# Patient Record
Sex: FEMALE | Race: WHITE | Employment: OTHER | URBAN - METROPOLITAN AREA
[De-identification: names, ages, dates, MRNs, and addresses within clinical notes are randomized per-mention and may not be internally consistent; named-entity substitution may affect disease eponyms.]

---

## 2019-05-25 ENCOUNTER — HOSPITAL ENCOUNTER (EMERGENCY)
Age: 69
Discharge: HOME | End: 2019-05-25
Payer: MEDICARE

## 2019-05-25 ENCOUNTER — APPOINTMENT (OUTPATIENT)
Dept: GENERAL RADIOLOGY | Age: 69
End: 2019-05-25
Payer: MEDICARE

## 2019-05-25 VITALS
DIASTOLIC BLOOD PRESSURE: 71 MMHG | BODY MASS INDEX: 38.32 KG/M2 | SYSTOLIC BLOOD PRESSURE: 144 MMHG | TEMPERATURE: 97.8 F | RESPIRATION RATE: 16 BRPM | WEIGHT: 230 LBS | OXYGEN SATURATION: 97 % | HEIGHT: 65 IN | HEART RATE: 78 BPM
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
## 2019-05-25 DIAGNOSIS — M25.561 ACUTE PAIN OF RIGHT KNEE: ICD-10-CM

## 2019-05-25 DIAGNOSIS — M25.551 PAIN OF RIGHT HIP JOINT: ICD-10-CM

## 2019-05-25 PROCEDURE — 99284 EMERGENCY DEPT VISIT MOD MDM: CPT

## 2019-05-25 PROCEDURE — 73560 X-RAY EXAM OF KNEE 1 OR 2: CPT

## 2019-05-25 PROCEDURE — 6370000000 HC RX 637 (ALT 250 FOR IP): Performed by: NURSE PRACTITIONER

## 2019-05-25 PROCEDURE — 73502 X-RAY EXAM HIP UNI 2-3 VIEWS: CPT

## 2019-05-25 RX ORDER — CITALOPRAM 40 MG/1
40 TABLET ORAL DAILY
COMMUNITY

## 2019-05-25 RX ORDER — HYDROCODONE BITARTRATE AND ACETAMINOPHEN 5; 325 MG/1; MG/1
1 TABLET ORAL EVERY 8 HOURS PRN
COMMUNITY

## 2019-05-25 RX ORDER — LOSARTAN POTASSIUM 50 MG/1
50 TABLET ORAL DAILY
COMMUNITY

## 2019-05-25 RX ORDER — HYDROCODONE BITARTRATE AND ACETAMINOPHEN 5; 325 MG/1; MG/1
1 TABLET ORAL ONCE
Status: COMPLETED | OUTPATIENT
Start: 2019-05-25 | End: 2019-05-25

## 2019-05-25 RX ORDER — MONTELUKAST SODIUM 4 MG/1
1 TABLET, CHEWABLE ORAL 2 TIMES DAILY
COMMUNITY

## 2019-05-25 RX ORDER — GABAPENTIN 100 MG/1
100 CAPSULE ORAL NIGHTLY
COMMUNITY

## 2019-05-25 RX ORDER — ALPRAZOLAM 0.25 MG/1
0.25 TABLET ORAL 3 TIMES DAILY PRN
COMMUNITY

## 2019-05-25 RX ADMIN — HYDROCODONE BITARTRATE AND ACETAMINOPHEN 1 TABLET: 5; 325 TABLET ORAL at 13:26

## 2019-05-25 ASSESSMENT — PAIN SCALES - GENERAL
PAINLEVEL_OUTOF10: 7
PAINLEVEL_OUTOF10: 7
PAINLEVEL_OUTOF10: 5

## 2019-05-25 NOTE — ED NOTES
Bed: 29  Expected date:   Expected time:   Means of arrival:   Comments:  shilpa Clement RN  05/25/19 [id number]

## 2019-05-25 NOTE — ED PROVIDER NOTES
[facility] Emergency Department    CHIEF COMPLAINT  Fall (Pt reports slipped on steps leading into swimming pool, pt reports pain in buttocks as well as right knee post fall. )      [facility]  Emiliano Price is a 71 y.o. female who presents to the ED complaining of pain in right hip and knee after falling prior to arrival. Patient reports that she was walking into the pool and slipped landing onto her buttocks and right side. Patient reports that she twisted her right knee. No head injury loss consciousness. No dizziness or lightheadedness. No neck or back pain. No chest pain or shortness of breath. No abdominal pain or discomfort. No numbness or tingling in extremities. Unilateral weakness. No loss of bowel or bladder function. No nausea, vomiting, or diarrhea. No other complaints, modifying factors or associated symptoms. Nursing notes reviewed. History reviewed. No pertinent past medical history. History reviewed. No pertinent surgical history. History reviewed. No pertinent family history.   Social History     Socioeconomic History    Marital status: Not on file     Spouse name: Not on file    Number of children: Not on file    Years of education: Not on file    Highest education level: Not on file   Occupational History    Not on file   Social Needs    Financial resource strain: Not on file    Food insecurity:     Worry: Not on file     Inability: Not on file    Transportation needs:     Medical: Not on file     Non-medical: Not on file   Tobacco Use    Smoking status: Former Smoker    Smokeless tobacco: Never Used   Substance and Sexual Activity    Alcohol use: Not Currently    Drug use: Not Currently    Sexual activity: Not on file   Lifestyle    Physical activity:     Days per week: Not on file     Minutes per session: Not on file    Stress: Not on file   Relationships    Social connections:     Talks on phone: Not on file     Gets together: Not on file     Attends [employer] service: Not on file     Active member of club or organization: Not on file     Attends meetings of clubs or organizations: Not on file     Relationship status: Not on file    Intimate partner violence:     Fear of current or ex partner: Not on file     Emotionally abused: Not on file     Physically abused: Not on file     Forced sexual activity: Not on file   Other Topics Concern    Not on file   Social History Narrative    Not on file     No current facility-administered medications for this encounter. No current outpatient medications on file. Allergies   Allergen Reactions    Percocet [Oxycodone-Acetaminophen] Rash     HTN       REVIEW OF SYSTEMS  6 systems reviewed, pertinent positives per HPI otherwise noted to be negative    PHYSICAL EXAM  BP (!) 144/71   Pulse 78   Temp 97.8 °F (36.6 °C) (Oral)   Resp 16   Ht 5' 5\" (1.651 m)   Wt 230 lb (104.3 kg)   SpO2 97%   BMI 38.27 kg/m²   GENERAL APPEARANCE: Awake and alert. Cooperative. No acute distress. Nontoxic in appearance. Speech is clear patient answers questions appropriately. HEAD: Normocephalic. Atraumatic. EYES: PERRL. EOM's grossly intact. ENT: Mucous membranes are pink and moist.   NECK: Supple. Normal ROM. No midline bony tenderness, step off or crepitus  CHEST: Equal symmetric chest rise. LUNGS: Breathing is unlabored. Speaking comfortably in full sentences. Lungs clear to auscultation throughout. Abdomen: Nondistended and non tender. EXTREMITIES: MAEE. No acute deformities. Neurovascularly intact. Brisk cap refill. Pulses palpable +2/2 /dorsalis pedis equal bilaterally. No unilateral weakness. Equal grasps bilaterally. No cervical, thoracic, lumbar midline bony tenderness. No step-off or crepitus. Normal dorsal flexion and plantar flexion of the wrist and ankles bilaterally. Limited range of motion of the right patella due to pain.  Pain upon palpation anterior/medial/lateral/posterior aspect of right patella. Pain with palpation of right posterior/lateral hip. No external/internal/shortening or rotation noted. SKIN: Warm and dry. No Rashes, lesions, or ecchymosis. NEUROLOGICAL: Alert and oriented. Strength is 5/5 in all extremities and sensation is intact. No Focal or motor deficits. RADIOLOGY  Xr Hip Right (2-3 Views)    Result Date: 5/25/2019  EXAMINATION: 2 XRAY VIEWS OF THE RIGHT HIP 5/25/2019 1:28 pm COMPARISON: None. HISTORY: ORDERING SYSTEM PROVIDED HISTORY: pain, fall TECHNOLOGIST PROVIDED HISTORY: Reason for exam:->pain, fall FINDINGS: AP view of the pelvis demonstrates intact pubic rami. Radiodense material is present within the right colon. Stool gas projects over the sacrum. Two views of the right hip demonstrate mild axial joint space narrowing. Study somewhat limited by underpenetration, but no acute fracture is identified. Minor degenerative changes. No acute plain film abnormality identified. Xr Knee Right (1-2 Views)    Result Date: 5/25/2019  EXAMINATION: 2 XRAY VIEWS OF THE RIGHT KNEE 5/25/2019 1:09 pm COMPARISON: None. HISTORY: ORDERING SYSTEM PROVIDED HISTORY: fall TECHNOLOGIST PROVIDED HISTORY: Reason for exam:->fall FINDINGS: There is a total knee arthroplasty with cemented components. No acute fracture or dislocation. No acute hardware failure or loosening. Grossly normal alignment. Calcifications seen superior to the patella may be from old trauma. 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 No acute bony or joint abnormality         ED COURSE   I have evaluated this patient on my own per my scope of practice. Supervising physician available during ED course of treatment for consultation. .      Vital signs stable. Patient was given Norco for pain, with good relief.  Radiological imaging of the right hip and knee revealed no acute bony abnormalities per radiologist. Patient was able to ambulate and gait is steady. A discussion was had with . [facility] regarding Ed findings, results and follow up. All questions were answered. Patient will follow up with  PCP and [place] [facility] for further evaluation/treatment. Patient will return to ED for new/worsening symptoms. Patient comfortable  will upon discharge. Return precautions were discussed in detail. Patient agreeable with plan of care, treatment, and discharge at this time. Patient has prescription at home for [facility] and was instructed to take it for any new or worsening pain. No prescriptions were sent with patient upon discharge. MDM  I estimate there is LOW risk for COMPARTMENT SYNDROME, DEEP VENOUS THROMBOSIS, SEPTIC ARTHRITIS, TENDON OR NEUROVASCULAR INJURY, thus I consider the discharge disposition reasonable. Braxton Marcano and I have discussed the diagnosis and risks, and we agree with discharging home to follow-up with their primary doctor or the referral orthopedist. We also discussed returning to the Emergency Department immediately if new or worsening symptoms occur. We have discussed the symptoms which are most concerning (e.g., changing or worsening pain, numbness, weakness) that necessitate immediate return. Final Impression    1. Fall, initial encounter    2. Acute pain of right knee    3. Pain of right hip joint        Blood pressure (!) 144/71, pulse 78, temperature 97.8 °F (36.6 °C), temperature source Oral, resp. rate 16, height 5' 5\" (1.651 m), weight 230 lb (104.3 kg), SpO2 97 %. DISPOSITION  Patient was discharged to home in good condition.           Christiana Morse, LEVAR - JEFFRY  05/25/19 509

## 2019-05-25 NOTE — ED NOTES
Discharge instructions reviewed with Pt. Pt verbalizes understanding at this time. medications reviewed with pt at this time. Pt condition stable at this time. No concerns voiced.       Valentin Acosta RN  05/25/19 [id number]